# Patient Record
Sex: MALE | Race: OTHER | Employment: OTHER | URBAN - METROPOLITAN AREA
[De-identification: names, ages, dates, MRNs, and addresses within clinical notes are randomized per-mention and may not be internally consistent; named-entity substitution may affect disease eponyms.]

---

## 2020-07-13 ENCOUNTER — HOSPITAL ENCOUNTER (EMERGENCY)
Age: 20
Discharge: HOME | End: 2020-07-13
Attending: EMERGENCY MEDICINE
Payer: OTHER GOVERNMENT

## 2020-07-13 VITALS
DIASTOLIC BLOOD PRESSURE: 67 MMHG | BODY MASS INDEX: 24.62 KG/M2 | SYSTOLIC BLOOD PRESSURE: 109 MMHG | RESPIRATION RATE: 18 BRPM | TEMPERATURE: 98.1 F | HEART RATE: 62 BPM | WEIGHT: 172 LBS | HEIGHT: 70 IN | OXYGEN SATURATION: 100 %

## 2020-07-13 DIAGNOSIS — F43.0 STRESS REACTION: Primary | ICD-10-CM

## 2020-07-13 LAB
ALBUMIN SERPL-MCNC: 4.8 G/DL (ref 3.4–5)
ALBUMIN/GLOB SERPL: 1.6 {RATIO} (ref 0.8–1.7)
ALP SERPL-CCNC: 68 U/L (ref 45–117)
ALT SERPL-CCNC: 33 U/L (ref 16–61)
AMPHET UR QL SCN: NEGATIVE
ANION GAP SERPL CALC-SCNC: 3 MMOL/L (ref 3–18)
APAP SERPL-MCNC: <2 UG/ML (ref 10–30)
APPEARANCE UR: CLEAR
AST SERPL-CCNC: 18 U/L (ref 10–38)
ATRIAL RATE: 88 BPM
BARBITURATES UR QL SCN: NEGATIVE
BASOPHILS # BLD: 0 K/UL (ref 0–0.1)
BASOPHILS NFR BLD: 0 % (ref 0–2)
BENZODIAZ UR QL: NEGATIVE
BILIRUB SERPL-MCNC: 0.6 MG/DL (ref 0.2–1)
BILIRUB UR QL: NEGATIVE
BUN SERPL-MCNC: 17 MG/DL (ref 7–18)
BUN/CREAT SERPL: 18 (ref 12–20)
CALCIUM SERPL-MCNC: 9.5 MG/DL (ref 8.5–10.1)
CALCULATED P AXIS, ECG09: 53 DEGREES
CALCULATED R AXIS, ECG10: 51 DEGREES
CALCULATED T AXIS, ECG11: 38 DEGREES
CANNABINOIDS UR QL SCN: NEGATIVE
CHLORIDE SERPL-SCNC: 106 MMOL/L (ref 100–111)
CO2 SERPL-SCNC: 29 MMOL/L (ref 21–32)
COCAINE UR QL SCN: NEGATIVE
COLOR UR: YELLOW
CREAT SERPL-MCNC: 0.94 MG/DL (ref 0.6–1.3)
DIAGNOSIS, 93000: NORMAL
DIFFERENTIAL METHOD BLD: NORMAL
EOSINOPHIL # BLD: 0.2 K/UL (ref 0–0.4)
EOSINOPHIL NFR BLD: 3 % (ref 0–5)
ERYTHROCYTE [DISTWIDTH] IN BLOOD BY AUTOMATED COUNT: 12.3 % (ref 11.6–14.5)
ETHANOL SERPL-MCNC: 4 MG/DL (ref 0–3)
GLOBULIN SER CALC-MCNC: 3 G/DL (ref 2–4)
GLUCOSE SERPL-MCNC: 92 MG/DL (ref 74–99)
GLUCOSE UR STRIP.AUTO-MCNC: NEGATIVE MG/DL
HCT VFR BLD AUTO: 43.9 % (ref 36–48)
HDSCOM,HDSCOM: NORMAL
HGB BLD-MCNC: 15.1 G/DL (ref 13–16)
HGB UR QL STRIP: NEGATIVE
KETONES UR QL STRIP.AUTO: NEGATIVE MG/DL
LEUKOCYTE ESTERASE UR QL STRIP.AUTO: NEGATIVE
LYMPHOCYTES # BLD: 1.6 K/UL (ref 0.9–3.6)
LYMPHOCYTES NFR BLD: 21 % (ref 21–52)
MCH RBC QN AUTO: 28.8 PG (ref 24–34)
MCHC RBC AUTO-ENTMCNC: 34.4 G/DL (ref 31–37)
MCV RBC AUTO: 83.6 FL (ref 74–97)
METHADONE UR QL: NEGATIVE
MONOCYTES # BLD: 0.4 K/UL (ref 0.05–1.2)
MONOCYTES NFR BLD: 5 % (ref 3–10)
NEUTS SEG # BLD: 5.3 K/UL (ref 1.8–8)
NEUTS SEG NFR BLD: 71 % (ref 40–73)
NITRITE UR QL STRIP.AUTO: NEGATIVE
OPIATES UR QL: NEGATIVE
P-R INTERVAL, ECG05: 138 MS
PCP UR QL: NEGATIVE
PH UR STRIP: 8 [PH] (ref 5–8)
PLATELET # BLD AUTO: 213 K/UL (ref 135–420)
PMV BLD AUTO: 10.8 FL (ref 9.2–11.8)
POTASSIUM SERPL-SCNC: 4.6 MMOL/L (ref 3.5–5.5)
PROT SERPL-MCNC: 7.8 G/DL (ref 6.4–8.2)
PROT UR STRIP-MCNC: NEGATIVE MG/DL
Q-T INTERVAL, ECG07: 346 MS
QRS DURATION, ECG06: 92 MS
QTC CALCULATION (BEZET), ECG08: 418 MS
RBC # BLD AUTO: 5.25 M/UL (ref 4.7–5.5)
SALICYLATES SERPL-MCNC: <1.7 MG/DL (ref 2.8–20)
SODIUM SERPL-SCNC: 138 MMOL/L (ref 136–145)
SP GR UR REFRACTOMETRY: 1.02 (ref 1–1.03)
UROBILINOGEN UR QL STRIP.AUTO: 0.2 EU/DL (ref 0.2–1)
VENTRICULAR RATE, ECG03: 88 BPM
WBC # BLD AUTO: 7.5 K/UL (ref 4.6–13.2)

## 2020-07-13 PROCEDURE — 80053 COMPREHEN METABOLIC PANEL: CPT

## 2020-07-13 PROCEDURE — 85025 COMPLETE CBC W/AUTO DIFF WBC: CPT

## 2020-07-13 PROCEDURE — 80307 DRUG TEST PRSMV CHEM ANLYZR: CPT

## 2020-07-13 PROCEDURE — 93005 ELECTROCARDIOGRAM TRACING: CPT

## 2020-07-13 PROCEDURE — 81003 URINALYSIS AUTO W/O SCOPE: CPT

## 2020-07-13 PROCEDURE — 99285 EMERGENCY DEPT VISIT HI MDM: CPT

## 2020-07-13 NOTE — PROGRESS NOTES
Page received, [facility] not able to accept at this time, permission given to seek civilian placement per MD.     [facility]- awaiting call back    [facility]- at [address],  [address]- at capacity, no discharges tonight. [facility]- at [address] [facility]- information faxed, they have back log of pts but will review. [id number] [facility] left     [facility] - they are at capacity for male [employer], advised to call in the morning if bed still needed, speak with Wayne Dunbar @ [phone] ext [phone]. [facility]- at capacity. [facility]- at capacity, per message    [facility]- one pending admission, no beds available    [place]- faxed referral per voice message    [id number]- received page from MD- unless [place] is on diversion,  won't cover civilian placement- updated MD that no one has bed available at this time. Will await update if [place] goes on diversion.

## 2020-07-13 NOTE — ED PROVIDER NOTES
EMERGENCY DEPARTMENT HISTORY AND PHYSICAL EXAM    Date: 7/13/2020  Patient Name: Thelma Macario    History of Presenting Illness     Chief Complaint   Patient presents with    Suicidal         History Provided By: Patient    36  Gee Shabazz is a 20 y.o. male with PMHX of PTSD who presents to the emergency department C/O SI. 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 Patient reports thoughts of killing himself but no active plan. He has prior history of suicide attempt where he says he choked himself with a belt. Denies all medical complaints at this time. PCP: Beau, MD Estuardo        Past History     Past Medical History:  No past medical history on file. Past Surgical History:  No past surgical history on file. Family History:  No family history on file. Social History:  Social History     Tobacco Use    Smoking status: Not on file   Substance Use Topics    Alcohol use: Not on file    Drug use: Not on file       Allergies:  No Known Allergies      Review of Systems   Review of Systems   Constitutional: Negative for chills and fever. Respiratory: Negative for shortness of breath. Cardiovascular: Negative for chest pain. Gastrointestinal: Negative for abdominal pain, nausea and vomiting. All other systems reviewed and are negative. Physical Exam     Vitals:    07/13/20 1225 07/13/20 1245 07/13/20 1711   BP: 136/70 117/61 109/67   Pulse: 89  62   Resp: 18  18   Temp: 98.3 °F (36.8 °C)  98.1 °F (36.7 °C)   SpO2: 98% 100% 100%   Weight: 78 kg (172 lb)     Height: 5' 10\" (1.778 m)       Physical Exam  Vitals signs and nursing note reviewed. Constitutional:       General: He is not in acute distress. Appearance: He is well-developed. HENT:      Head: Normocephalic and atraumatic. Eyes:      Conjunctiva/sclera: Conjunctivae normal.      Pupils: Pupils are equal, round, and reactive to light. Neck:      Musculoskeletal: Normal range of motion and neck supple. Cardiovascular:      Rate and Rhythm: Normal rate and regular rhythm. Heart sounds: Normal heart sounds. Pulmonary:      Effort: Pulmonary effort is normal. No respiratory distress. Breath sounds: Normal breath sounds. No wheezing or rales. Chest:      Chest wall: No tenderness. Abdominal:      General: There is no distension. Palpations: Abdomen is soft. Tenderness: There is no abdominal tenderness. There is no guarding or rebound. Musculoskeletal: Normal range of motion. General: No tenderness. Lymphadenopathy:      Cervical: No cervical adenopathy. Skin:     General: Skin is warm and dry. Neurological:      General: No focal deficit present. Mental Status: He is alert and oriented to person, place, and time. Cranial Nerves: No cranial nerve deficit. Motor: No abnormal muscle tone. Coordination: Coordination normal.   Psychiatric:         Mood and Affect: Mood normal.         Behavior: Behavior normal.             Diagnostic Study Results     Labs -     Recent Results (from the past 12 hour(s))   CBC WITH AUTOMATED DIFF    Collection Time: 07/13/20 12:58 PM   Result Value Ref Range    WBC 7.5 4.6 - 13.2 K/uL    RBC 5.25 4.70 - 5.50 M/uL    HGB 15.1 13.0 - 16.0 g/dL    HCT 43.9 36.0 - 48.0 %    MCV 83.6 74.0 - 97.0 FL    MCH 28.8 24.0 - 34.0 PG    MCHC 34.4 31.0 - 37.0 g/dL    RDW 12.3 11.6 - 14.5 %    PLATELET 790 427 - 703 K/uL    MPV 10.8 9.2 - 11.8 FL    NEUTROPHILS 71 40 - 73 %    LYMPHOCYTES 21 21 - 52 %    MONOCYTES 5 3 - 10 %    EOSINOPHILS 3 0 - 5 %    BASOPHILS 0 0 - 2 %    ABS. NEUTROPHILS 5.3 1.8 - 8.0 K/UL    ABS. LYMPHOCYTES 1.6 0.9 - 3.6 K/UL    ABS. MONOCYTES 0.4 0.05 - 1.2 K/UL    ABS. EOSINOPHILS 0.2 0.0 - 0.4 K/UL    ABS.  BASOPHILS 0.0 0.0 - 0.1 K/UL    DF AUTOMATED     METABOLIC PANEL, COMPREHENSIVE    Collection Time: 07/13/20 12:58 PM   Result Value Ref Range Sodium 138 136 - 145 mmol/L    Potassium 4.6 3.5 - 5.5 mmol/L    Chloride 106 100 - 111 mmol/L    CO2 29 21 - 32 mmol/L    Anion gap 3 3.0 - 18 mmol/L    Glucose 92 74 - 99 mg/dL    BUN 17 7.0 - 18 MG/DL    Creatinine 0.94 0.6 - 1.3 MG/DL    BUN/Creatinine ratio 18 12 - 20      GFR est AA >60 >60 ml/min/1.73m2    GFR est non-AA >60 >60 ml/min/1.73m2    Calcium 9.5 8.5 - 10.1 MG/DL    Bilirubin, total 0.6 0.2 - 1.0 MG/DL    ALT (SGPT) 33 16 - 61 U/L    AST (SGOT) 18 10 - 38 U/L    Alk.  phosphatase 68 45 - 117 U/L    Protein, total 7.8 6.4 - 8.2 g/dL    Albumin 4.8 3.4 - 5.0 g/dL    Globulin 3.0 2.0 - 4.0 g/dL    A-G Ratio 1.6 0.8 - 1.7     ETHYL ALCOHOL    Collection Time: 07/13/20 12:58 PM   Result Value Ref Range    ALCOHOL(ETHYL),SERUM 4 (H) 0 - 3 MG/DL   ACETAMINOPHEN    Collection Time: 07/13/20 12:58 PM   Result Value Ref Range    Acetaminophen level <2 (L) 10.0 - 36.2 ug/mL   SALICYLATE    Collection Time: 07/13/20 12:58 PM   Result Value Ref Range    Salicylate level <4.5 (L) 2.8 - 20.0 MG/DL   EKG, 12 LEAD, INITIAL    Collection Time: 07/13/20 12:59 PM   Result Value Ref Range    Ventricular Rate 88 BPM    Atrial Rate 88 BPM    P-R Interval 138 ms    QRS Duration 92 ms    Q-T Interval 346 ms    QTC Calculation (Bezet) 418 ms    Calculated P Axis 53 degrees    Calculated R Axis 51 degrees    Calculated T Axis 38 degrees    Diagnosis       Normal sinus rhythm  ST elevation, probably due to early repolarization  Otherwise normal ECG  No previous ECGs available  Confirmed by Khushbu Dowling MD. ([id number]) on 7/13/2020 8:36:39 PM     URINALYSIS W/ RFLX MICROSCOPIC    Collection Time: 07/13/20  1:00 PM   Result Value Ref Range    Color YELLOW      Appearance CLEAR      Specific gravity 1.017 1.005 - 1.030      pH (UA) 8.0 5.0 - 8.0      Protein Negative NEG mg/dL    Glucose Negative NEG mg/dL    Ketone Negative NEG mg/dL    Bilirubin Negative NEG      Blood Negative NEG      Urobilinogen 0.2 0.2 - 1.0 EU/dL Nitrites Negative NEG      Leukocyte Esterase Negative NEG     DRUG SCREEN, URINE    Collection Time: 07/13/20  1:00 PM   Result Value Ref Range    BENZODIAZEPINES Negative NEG      BARBITURATES Negative NEG      THC (TH-CANNABINOL) Negative NEG      OPIATES Negative NEG      PCP(PHENCYCLIDINE) Negative NEG      COCAINE Negative NEG      AMPHETAMINES Negative NEG      METHADONE Negative NEG      HDSCOM (NOTE)        Radiologic Studies -   No orders to display     CT Results  (Last 48 hours)    None        CXR Results  (Last 48 hours)    None          Medications given in the ED-  Medications - No data to display      Medical Decision Making   I am the first provider for this patient. I reviewed the vital signs, available nursing notes, past medical history, past surgical history, family history and social history. Vital Signs-Reviewed the patient's vital signs. Pulse Oximetry Analysis - 100% on RA normal     EKG interpretation: (Preliminary)  EKG read by Dr. Ricci Montoya at 1302   Normal sinus rhythm, normal intervals, normal axis, normal EKG    Records Reviewed: Nursing Notes and Old Medical Records    Provider Notes (Medical Decision Making): Gavi Zuniga is a 20 y.o. male here with thoughts of self-harm and prior history of suicide attempts. Patient offers no medical complaints, otherwise healthy 79-year-old male. Patient is medically cleared will discuss with [facility] regarding transfer    Procedures:  Procedures    ED Course:   CONSULT NOTE:   2:24 PM   I discussed care with Dr Weston Cabrera It was a standard discussion, including history of patients chief complaint, available diagnostic results, and treatment course. Discussed case. 4:44 PM   I discussed care with Dr Weston Cabrera  It was a standard discussion, including history of patients chief complaint, available diagnostic results, and treatment course. Discussed case.  Unfortunately they are full and no discharges planned until AM. I will discuss with case torsten here. 5:18 PM   I discussed care with Dr Erwin Gone It was a standard discussion, including history of patients chief complaint, available diagnostic results, and treatment course. Discussed case. He clarifies that until [place] [facility] is officially on divert the patient will NOT be covered by  if he goes to [facility] psych placement    7:31 PM  Patient's presentation, labs/imaging and plan of care was reviewed with Dr Milka Monahan as part of sign out. They will follow up with transfer as part of the plan discussed with the patient. Dr Deion Phillips assistance in completion of this plan is greatly appreciated but it should be noted that I will be the provider of record for this patient. After discussion with the patient that he states that he is not actually feeling suicidal.  He states when he was asked if he had had thoughts of dying over the last month he answered yes but upon further discussion he denies any active thoughts of suicidal ideation or a plan in place. He states he does see a [facility] once a week and feels that is sufficient for him at this time. I recommended he come back to the emergency department if his symptoms worsen. He understands and agrees to plan      Diagnosis and Disposition         DISCHARGE NOTE:    Gee Christopherkassidy's  results have been reviewed with him. He has been counseled regarding his diagnosis, treatment, and plan. He verbally conveys understanding and agreement of the signs, symptoms, diagnosis, treatment and prognosis and additionally agrees to follow up as discussed. He also agrees with the care-plan and conveys that all of his questions have been answered.   I have also provided discharge instructions for him that include: educational information regarding their diagnosis and treatment, and list of reasons why they would want to return to the ED prior to their follow-up appointment, should his condition change. He has been provided with education for proper emergency department utilization. CLINICAL IMPRESSION:    1. Stress reaction        PLAN:  1. D/C Home  2. There are no discharge medications for this patient. 3.   Follow-up Information     Follow up With Specialties Details Why Contact [facility]  Call       [facility] EMERGENCY DEPT Emergency Medicine Go to  If symptoms worsen [address] [id number]  [phone]        _______________________________      Please note that this dictation was completed with "[employer] the computer voice recognition software. Quite often unanticipated grammatical, syntax, homophones, and other interpretive errors are inadvertently transcribed by the computer software. Please disregard these errors. Please excuse any errors that have escaped final proofreading.

## 2020-07-13 NOTE — ED TRIAGE NOTES
Pt reports via EMS for depression/sucide. He reports that [place] and has been having PTSD and major depression since. He reports that he has nightmares of the war and states that he lost two friends to suicide. He states that he has had those thoughts but reports that he will not act on it because he knows how it affects people. He does report having panic attacks. He reports taking medication but only as PRN for sleep. He is ANO X4 and is NAD at this time.